# Patient Record
Sex: MALE | Race: BLACK OR AFRICAN AMERICAN | NOT HISPANIC OR LATINO | Employment: UNEMPLOYED | URBAN - METROPOLITAN AREA
[De-identification: names, ages, dates, MRNs, and addresses within clinical notes are randomized per-mention and may not be internally consistent; named-entity substitution may affect disease eponyms.]

---

## 2017-01-06 ENCOUNTER — HOSPITAL ENCOUNTER (EMERGENCY)
Facility: HOSPITAL | Age: 22
Discharge: HOME | End: 2017-01-06
Attending: EMERGENCY MEDICINE | Admitting: EMERGENCY MEDICINE
Payer: MEDICAID

## 2017-01-06 VITALS
HEIGHT: 71 IN | RESPIRATION RATE: 16 BRPM | WEIGHT: 260 LBS | DIASTOLIC BLOOD PRESSURE: 91 MMHG | OXYGEN SATURATION: 98 % | BODY MASS INDEX: 36.4 KG/M2 | SYSTOLIC BLOOD PRESSURE: 144 MMHG | HEART RATE: 82 BPM | TEMPERATURE: 98 F

## 2017-01-06 DIAGNOSIS — J02.9 PHARYNGITIS, UNSPECIFIED ETIOLOGY: ICD-10-CM

## 2017-01-06 DIAGNOSIS — J36 PERITONSILLAR ABSCESS: Primary | ICD-10-CM

## 2017-01-06 LAB
ALBUMIN SERPL BCP-MCNC: 4 G/DL
ALP SERPL-CCNC: 99 U/L
ALT SERPL W/O P-5'-P-CCNC: 12 U/L
ANION GAP SERPL CALC-SCNC: 10 MMOL/L
AST SERPL-CCNC: 14 U/L
BASOPHILS # BLD AUTO: 0.03 K/UL
BASOPHILS NFR BLD: 0.1 %
BILIRUB SERPL-MCNC: 0.9 MG/DL
BUN SERPL-MCNC: 13 MG/DL
CALCIUM SERPL-MCNC: 10 MG/DL
CHLORIDE SERPL-SCNC: 106 MMOL/L
CO2 SERPL-SCNC: 22 MMOL/L
CREAT SERPL-MCNC: 1 MG/DL
DIFFERENTIAL METHOD: ABNORMAL
EOSINOPHIL # BLD AUTO: 0 K/UL
EOSINOPHIL NFR BLD: 0.1 %
ERYTHROCYTE [DISTWIDTH] IN BLOOD BY AUTOMATED COUNT: 13 %
EST. GFR  (AFRICAN AMERICAN): >60 ML/MIN/1.73 M^2
EST. GFR  (NON AFRICAN AMERICAN): >60 ML/MIN/1.73 M^2
GLUCOSE SERPL-MCNC: 134 MG/DL
HCT VFR BLD AUTO: 40.4 %
HGB BLD-MCNC: 14.4 G/DL
LYMPHOCYTES # BLD AUTO: 1 K/UL
LYMPHOCYTES NFR BLD: 4.7 %
MCH RBC QN AUTO: 26.4 PG
MCHC RBC AUTO-ENTMCNC: 35.6 %
MCV RBC AUTO: 74 FL
MONOCYTES # BLD AUTO: 0.2 K/UL
MONOCYTES NFR BLD: 0.9 %
NEUTROPHILS # BLD AUTO: 19.5 K/UL
NEUTROPHILS NFR BLD: 93.9 %
PLATELET # BLD AUTO: 278 K/UL
PMV BLD AUTO: 10.8 FL
POTASSIUM SERPL-SCNC: 3.7 MMOL/L
PROT SERPL-MCNC: 8.4 G/DL
RBC # BLD AUTO: 5.45 M/UL
SODIUM SERPL-SCNC: 138 MMOL/L
WBC # BLD AUTO: 20.78 K/UL

## 2017-01-06 PROCEDURE — 96375 TX/PRO/DX INJ NEW DRUG ADDON: CPT

## 2017-01-06 PROCEDURE — 87070 CULTURE OTHR SPECIMN AEROBIC: CPT

## 2017-01-06 PROCEDURE — 25000003 PHARM REV CODE 250: Performed by: OTOLARYNGOLOGY

## 2017-01-06 PROCEDURE — 96365 THER/PROPH/DIAG IV INF INIT: CPT

## 2017-01-06 PROCEDURE — 87075 CULTR BACTERIA EXCEPT BLOOD: CPT

## 2017-01-06 PROCEDURE — 99284 EMERGENCY DEPT VISIT MOD MDM: CPT | Mod: ,,, | Performed by: PHYSICIAN ASSISTANT

## 2017-01-06 PROCEDURE — 96376 TX/PRO/DX INJ SAME DRUG ADON: CPT

## 2017-01-06 PROCEDURE — 25000003 PHARM REV CODE 250: Performed by: PHYSICIAN ASSISTANT

## 2017-01-06 PROCEDURE — 63600175 PHARM REV CODE 636 W HCPCS: Performed by: OTOLARYNGOLOGY

## 2017-01-06 PROCEDURE — 63600175 PHARM REV CODE 636 W HCPCS: Performed by: NURSE PRACTITIONER

## 2017-01-06 PROCEDURE — 99284 EMERGENCY DEPT VISIT MOD MDM: CPT | Mod: 25

## 2017-01-06 PROCEDURE — 87205 SMEAR GRAM STAIN: CPT

## 2017-01-06 PROCEDURE — 63600175 PHARM REV CODE 636 W HCPCS: Performed by: PHYSICIAN ASSISTANT

## 2017-01-06 PROCEDURE — 80053 COMPREHEN METABOLIC PANEL: CPT

## 2017-01-06 PROCEDURE — 85025 COMPLETE CBC W/AUTO DIFF WBC: CPT

## 2017-01-06 RX ORDER — HYDROCODONE BITARTRATE AND ACETAMINOPHEN 5; 325 MG/1; MG/1
1 TABLET ORAL EVERY 6 HOURS PRN
Qty: 12 TABLET | Refills: 0 | Status: SHIPPED | OUTPATIENT
Start: 2017-01-06 | End: 2017-01-16

## 2017-01-06 RX ORDER — CLINDAMYCIN PHOSPHATE 600 MG/50ML
600 INJECTION, SOLUTION INTRAVENOUS
Status: COMPLETED | OUTPATIENT
Start: 2017-01-06 | End: 2017-01-06

## 2017-01-06 RX ORDER — MORPHINE SULFATE 2 MG/ML
2 INJECTION, SOLUTION INTRAMUSCULAR; INTRAVENOUS ONCE
Status: COMPLETED | OUTPATIENT
Start: 2017-01-06 | End: 2017-01-06

## 2017-01-06 RX ORDER — CLINDAMYCIN HYDROCHLORIDE 150 MG/1
300 CAPSULE ORAL 4 TIMES DAILY
Qty: 56 CAPSULE | Refills: 0 | Status: SHIPPED | OUTPATIENT
Start: 2017-01-06 | End: 2017-01-13

## 2017-01-06 RX ORDER — DEXAMETHASONE SODIUM PHOSPHATE 4 MG/ML
12 INJECTION, SOLUTION INTRA-ARTICULAR; INTRALESIONAL; INTRAMUSCULAR; INTRAVENOUS; SOFT TISSUE
Status: COMPLETED | OUTPATIENT
Start: 2017-01-06 | End: 2017-01-06

## 2017-01-06 RX ORDER — MORPHINE SULFATE 2 MG/ML
6 INJECTION, SOLUTION INTRAMUSCULAR; INTRAVENOUS
Status: COMPLETED | OUTPATIENT
Start: 2017-01-06 | End: 2017-01-06

## 2017-01-06 RX ORDER — METHYLPREDNISOLONE 4 MG/1
TABLET ORAL
Qty: 1 PACKAGE | Refills: 0 | Status: SHIPPED | OUTPATIENT
Start: 2017-01-06 | End: 2017-01-27

## 2017-01-06 RX ORDER — LIDOCAINE HYDROCHLORIDE AND EPINEPHRINE 10; 10 MG/ML; UG/ML
20 INJECTION, SOLUTION INFILTRATION; PERINEURAL ONCE
Status: COMPLETED | OUTPATIENT
Start: 2017-01-06 | End: 2017-01-06

## 2017-01-06 RX ADMIN — CLINDAMYCIN IN 5 PERCENT DEXTROSE 600 MG: 12 INJECTION, SOLUTION INTRAVENOUS at 04:01

## 2017-01-06 RX ADMIN — DEXAMETHASONE SODIUM PHOSPHATE 12 MG: 4 INJECTION, SOLUTION INTRAMUSCULAR; INTRAVENOUS at 01:01

## 2017-01-06 RX ADMIN — MORPHINE SULFATE 2 MG: 2 INJECTION, SOLUTION INTRAMUSCULAR; INTRAVENOUS at 07:01

## 2017-01-06 RX ADMIN — PENICILLIN G BENZATHINE 1.2 MILLION UNITS: 1200000 INJECTION, SUSPENSION INTRAMUSCULAR at 01:01

## 2017-01-06 RX ADMIN — MORPHINE SULFATE 6 MG: 2 INJECTION, SOLUTION INTRAMUSCULAR; INTRAVENOUS at 04:01

## 2017-01-06 RX ADMIN — LIDOCAINE HYDROCHLORIDE AND EPINEPHRINE 20 ML: 10; 10 INJECTION, SOLUTION INFILTRATION; PERINEURAL at 08:01

## 2017-01-06 NOTE — ED NOTES
Pt identifiers Steve Genao were checked and are correct  LOC: The patient is awake, alert, aware of environment with an appropriate affect. Oriented x3, speaking appropriately  APPEARANCE: Pt resting comfortably, in no acute distress, pt is clean and well groomed, clothing properly fastened  SKIN: Skin warm, dry and intact, normal skin turgor, moist mucus membranes  RESPIRATORY: Airway is open and patent, respirations are spontaneous, even and unlabored, normal effort and rate  CARDIAC: Normal rate and rhythm  ABDOMEN: Soft, nontender, nondistended  NEUROLOGIC: PERRL, facial expression is symmetrical, patient moving all extremities spontaneously.  Follows all commands appropriately  MUSCULOSKELETAL: No obvious deformities.

## 2017-01-06 NOTE — ED PROVIDER NOTES
"Encounter Date: 1/6/2017    SCRIBE #1 NOTE: I, Kolby-Valerie Brenda , am scribing for, and in the presence of,  Gerald Giron MD . I have scribed the following portions of the note - Other sections scribed: HPI/ROS .       History     Chief Complaint   Patient presents with    Sore Throat     " My tonsils are swollen and it hurts to talk." Pt reports symptoms began 3-4 days ago. Pt denies relief from gargling with warm water and salt     Review of patient's allergies indicates:  No Known Allergies  HPI Comments: CC: Sore Throat     HPI: This 21 y.o. Male smoker presents to the ED c/o a 3-day hx of acute-onset (10/10) sore throat (R>L) with associated chills, R ear pain, voice change, and nausea. Pt notes his sore throat began on the L but is now on both sides, with the R side being worse. Symptoms are constant, severe, and exacerbated with talking/swallowing.  He attempted tx with Tylenol and Nyquil with no improvement of pain; no prior attempted tx today. Additionally, pt notes that his tongue is "more white" than normal and denies any food or drink that would cause this. No recent sick contacts. Pt otherwise denies fever, congestion, rhinorrhea, cough, abdominal pain, V/D, dysuria, tinnitus, ear d/c, dysuria.      The history is provided by the patient. No  was used.     Past Medical History   Diagnosis Date    ADHD (attention deficit hyperactivity disorder)     Bipolar 1 disorder      Past Medical History Pertinent Negatives   Diagnosis Date Noted    Asthma 9/18/2013    Diabetes mellitus 9/18/2013    Hypertension 9/18/2013    Seizures 9/18/2013     History reviewed. No pertinent past surgical history.  Family History   Problem Relation Age of Onset    Diabetes Father     Hypertension Father      Social History   Substance Use Topics    Smoking status: Current Every Day Smoker     Packs/day: 0.25    Smokeless tobacco: None    Alcohol use Yes      Comment: occasionally     Review of " Systems   Constitutional: Positive for fever. Negative for chills.   HENT: Positive for ear pain (R), sore throat (R>L) and voice change. Negative for congestion, ear discharge, rhinorrhea, tinnitus and trouble swallowing.    Respiratory: Negative for cough and shortness of breath.    Gastrointestinal: Positive for nausea. Negative for abdominal pain, constipation, diarrhea, rectal pain and vomiting.   Genitourinary: Negative for decreased urine volume, difficulty urinating and dysuria.       Physical Exam   Initial Vitals   BP Pulse Resp Temp SpO2   01/06/17 1120 01/06/17 1120 01/06/17 1120 01/06/17 1120 01/06/17 1120   129/67 91 20 98.8 °F (37.1 °C) 95 %     Physical Exam    Nursing note and vitals reviewed.  Constitutional: Vital signs are normal. He appears well-developed and well-nourished. He is not diaphoretic. He is active and cooperative.  Non-toxic appearance. No distress.   HENT:   Mouth/Throat: No trismus in the jaw.   Bilateral tonsillar enlargement (R > L) with right pillar edema and uvular deviation.  Scant exudates. No drooling, trismus, cervical lymphadenopathy, or stridor.   Eyes: EOM are normal. Pupils are equal, round, and reactive to light.   Neck: No stridor present.   Pulmonary/Chest: Effort normal and breath sounds normal. No accessory muscle usage. No tachypnea and no bradypnea. No respiratory distress. He has no wheezes. He has no rhonchi. He has no rales.   Neurological: He is alert and oriented to person, place, and time. He has normal strength.         ED Course   Procedures  Labs Reviewed - No data to display             Additional MDM:   Comments: This is an urgent evaluation of a 21-year-old male that presents to the emergency room complaining of a sore throat for 3 days.  Patient has physical exam findings that are concerning for a peritonsillar abscess.  He has bilateral tonsillar enlargement with uvular deviation and right tonsillar wall edema.  There is no evidence of airway  compromise at this time; pt is tolerating liquids and solids and there is no drooling or trismus.  Will treat empirically for possible streptococcal infection with bicillin and decadron, but would appreciate ENT consult for possible drainage of abscess.  Pt is stable for [place] [facility] [place]  Pt will travel by personal vehicle.  Case discussed with attending, , and he personally evaluated this pt and is in agreement with plan.  .          Scribe Attestation:   Scribe #1: I performed the above scribed service and the documentation accurately describes the services I performed. I attest to the accuracy of the note.    Attending Attestation:     Physician Attestation Statement for NP/PA:   I have conducted a face to face encounter with this patient in addition to the NP/PA, due to    Other NP/PA Attestation Additions:     Physical Exam: This patient has swelling about the right tonsil.  There is minimal lymphadenopathy.  I'm not sure that I see exudates.  There is no real trismus or dysphagia.  I think this patient has a normal voice.   Medical Decision Making: Given the above there is concern for peritonsillar abscess.  The patient has limited resources.  We'll transfer him to the main campus for evaluation by ENT.  I find no threat to the airway at this time.  I doubt fascial space infections.       Physician Attestation for Scribe:  Physician Attestation Statement for Scribe #1: I, Gerald Giron MD , reviewed documentation, as scribed by Arabella Gutierrez  in my presence, and it is both accurate and complete.                 ED Course     Clinical Impression:   The primary encounter diagnosis was Pharyngitis, unspecified etiology. A diagnosis of Sore throat was also pertinent to this visit.    Disposition:   Disposition: Transferred  Condition: Stable       Malinda Medellin NP  01/06/17 1406       Gerald Giron MD  01/07/17 4691

## 2017-01-06 NOTE — ED PROVIDER NOTES
"Encounter Date: 1/6/2017       History     Chief Complaint   Patient presents with    Sore Throat     " My tonsils are swollen and it hurts to talk." Pt reports symptoms began 3-4 days ago. Pt denies relief from gargling with warm water and salt     Review of patient's allergies indicates:  No Known Allergies  HPI Comments: 21-year-old -American male with past medical history of ADHD and bipolar presents to the ED complaining of a sore throat for the past 2-3 days.  He was seen at [facility] ED earlier today and was transferred to [facility] ED for ENT evaluation.  He reports that his sore throat started on the left side and was initially relieved with over-the-counter throat lozenges.  Sore throat then moved to the right side and since then has been progressively worsening.  He's been gargling warm salt water with no improvement of his sore throat.  He is unable to tolerate solid foods and is only eating soft foods and drinking liquids due to severe pain with swallowing.  He has been spitting out most of his saliva and reports a muffled voice.  He is not currently on any oral antibiotics.  He does report associated chills, shortness of breath, right ear pain, lightheadedness.  He denies any prior history of peritonsillar abscess.  He denies fever, chest pain, abdominal pain, nausea, vomiting, headache, numbness, weakness, sick contacts.  He smokes 1/4 pack per day, occasionally drinks alcohol, denies drug use.    The history is provided by the patient.     Past Medical History   Diagnosis Date    ADHD (attention deficit hyperactivity disorder)     Bipolar 1 disorder      Past Medical History Pertinent Negatives   Diagnosis Date Noted    Asthma 9/18/2013    Diabetes mellitus 9/18/2013    Hypertension 9/18/2013    Seizures 9/18/2013     History reviewed. No pertinent past surgical history.  Family History   Problem Relation Age of Onset    Diabetes Father     Hypertension Father      Social History "   Substance Use Topics    Smoking status: Current Every Day Smoker     Packs/day: 0.25     Types: Cigarettes    Smokeless tobacco: None    Alcohol use Yes      Comment: occasionally     Review of Systems   Constitutional: Positive for chills. Negative for fever.   HENT: Positive for ear pain (R), sore throat, trouble swallowing and voice change. Negative for congestion, postnasal drip and rhinorrhea.    Eyes: Negative for photophobia and visual disturbance.   Respiratory: Positive for shortness of breath.    Cardiovascular: Negative for chest pain.   Gastrointestinal: Negative for abdominal pain, constipation, diarrhea, nausea and vomiting.   Genitourinary: Negative for dysuria and hematuria.   Musculoskeletal: Negative for back pain, neck pain and neck stiffness.   Skin: Negative for rash and wound.   Neurological: Positive for light-headedness. Negative for dizziness, syncope, weakness, numbness and headaches.   Psychiatric/Behavioral: Negative for confusion.       Physical Exam   Initial Vitals   BP Pulse Resp Temp SpO2   01/06/17 1120 01/06/17 1120 01/06/17 1120 01/06/17 1120 01/06/17 1120   129/67 91 20 98.8 °F (37.1 °C) 95 %     Physical Exam    Nursing note and vitals reviewed.  Constitutional: He appears well-developed and well-nourished. He is not diaphoretic. No distress.   HENT:   Head: Normocephalic and atraumatic.   Right Ear: External ear and ear canal normal. Tympanic membrane is erythematous.   Left Ear: Tympanic membrane, external ear and ear canal normal.   Nose: Nose normal.   Mouth/Throat: Mucous membranes are normal. No trismus in the jaw. No uvula swelling. Posterior oropharyngeal edema and posterior oropharyngeal erythema present. No oropharyngeal exudate.   Bilateral tonsilar edema (R>L) with L sided uvular deviation. No exudates. Muffled voice. No stridor.    Neck: Normal range of motion. Neck supple.   Cardiovascular: Normal rate, regular rhythm and normal heart sounds. Exam reveals no  gallop and no friction rub.    No murmur heard.  Pulmonary/Chest: Breath sounds normal. No stridor. He has no wheezes. He has no rhonchi. He has no rales.   Abdominal: Soft. Bowel sounds are normal. There is no tenderness. There is no rebound and no guarding.   Musculoskeletal: Normal range of motion.   Lymphadenopathy:     He has no cervical adenopathy.   Neurological: He is alert and oriented to person, place, and time. He has normal strength.   Skin: Skin is warm and dry. No rash noted. No erythema.   Psychiatric: He has a normal mood and affect.         ED Course   Procedures  Labs Reviewed   CBC W/ AUTO DIFFERENTIAL - Abnormal; Notable for the following:        Result Value    WBC 20.78 (*)     MCV 74 (*)     MCH 26.4 (*)     Gran # 19.5 (*)     Mono # 0.2 (*)     Gran% 93.9 (*)     Lymph% 4.7 (*)     Mono% 0.9 (*)     All other components within normal limits   COMPREHENSIVE METABOLIC PANEL - Abnormal; Notable for the following:     CO2 22 (*)     Glucose 134 (*)     All other components within normal limits   CULTURE, ANAEROBIC   CULTURE, AEROBIC  (SPECIFY SOURCE)   GRAM STAIN    Narrative:     Right peritonsillar abscess             Medical Decision Making:   History:   Old Medical Records: I decided to obtain old medical records.  Old Records Summarized: records from previous admission(s).       <> Summary of Records: Patient seen at the [facility] ED earlier today.  Concern for right-sided peritonsillar abscess and patient [place] [facility] [place].  Patient given Decadron and Bicillin at [facility].  Clinical Tests:   Lab Tests: Ordered and Reviewed       APC / Resident Notes:   21-year-old -American male with past medical history of ADHD and bipolar presents to the ED complaining of a sore throat for the past 2-3 days.  Vital signs stable.  Regular rate and rhythm without murmurs.  Lungs are clear to auscultation bilaterally without wheezes.  Abdomen is soft and  nontender to palpation.  There is bilateral tonsillar edema noted (right greater than left) with left-sided uvular deviation.  No definite exudates noted.  No trismus or stridor.  No anterior cervical lymphadenopathy.  Patient does have a muffled voice.  Will get labs and consult ENT.    Patient was given 12 mg IM Decadron and IM Bicillin at 1:20 this afternoon.  Patient reports no improvement of symptoms with treatment.    Discussed with ENT and they will evaluate the patient in the ED.  They request IV clindamycin and pain medication.  Order placed.    CBC shows leukocytosis with WBC 20.78.  CMP with no acute abnormalities.    ENT evaluated the patient in the ED and drained peritonsillar abscess at bedside.  He tolerated the procedure well. Stable for discharge.    Per ENT recs, He was discharged with prescriptions for clindamycin, medrol dosepack, and norco.  He will follow up with ENT as needed.  He was given strict return precautions and expressed understanding.  All of the patient's questions were answered.  I reviewed the patient's chart and labs and discussed the case with my supervising physician.                 ED Course     Clinical Impression:   The primary encounter diagnosis was Peritonsillar abscess. A diagnosis of Pharyngitis, unspecified etiology was also pertinent to this visit.    Disposition:   Disposition: Discharged  Condition: Stable       Karime Cesar PA-C  01/06/17 [id number]

## 2017-01-06 NOTE — ED TRIAGE NOTES
Pt to ER for c/o sore throat started approximately 2-3 days ago, denies cough or vomiting, reports nausea

## 2017-01-06 NOTE — ED AVS SNAPSHOT
[facility]  [address]  [facility] [address]               Steve Genao   2017 12:30 PM   ED    Description:  Male : 1995   Department:  [facility]           Your Care was Coordinated By:     Provider Role From To    Gerald Giron MD Attending Provider [phone]    Iglesia Power MD Attending Provider [phone] --    Malinda Medellin NP Nurse Practitioner 17 [phone]    Karime Cesar PA-C Physician Assistant [phone] --    Gerald Giron MD Admitting Provider -- --      Reason for Visit     Sore Throat           Diagnoses this Visit        Comments    Peritonsillar abscess    -  Primary     Pharyngitis, unspecified etiology           ED Disposition     ED Disposition Condition Comment    Discharge             To Do List           Follow-up Information     Follow up with Sukhdeep Christopher - Otorhinolaryngology.    Specialty:  Otolaryngology    Why:  As needed    Contact information:    [address]  [facility] [id number] [phone]    Additional information:    Clinic [place] - 4th Floor       These Medications        Disp Refills Start End    clindamycin (CLEOCIN) 150 MG capsule 56 capsule 0 2017    Take 2 capsules (300 mg total) by mouth 4 (four) times daily. - Oral    methylPREDNISolone (MEDROL DOSEPACK) 4 mg tablet 1 Package 0 2017    Take as directed.    hydrocodone-acetaminophen 5-325mg (NORCO) 5-325 mg per tablet 12 tablet 0 2017    Take 1 tablet by mouth every 6 (six) hours as needed for Pain. - Oral      [facility] On [facility] Care Line -  Assistance  Registered nurses in the [facility] provide clinical advisement, health education, appointment booking, and other advisory services.  Call for this free service at [phone].             Medications           Message regarding Medications      Verify the changes and/or additions to your medication regime listed below are the same as discussed with your clinician today.  If any of these changes or additions are incorrect, please notify your healthcare provider.        START taking these NEW medications        Refills    clindamycin (CLEOCIN) 150 MG capsule 0    Sig: Take 2 capsules (300 mg total) by mouth 4 (four) times daily.    Class: Print    Route: Oral    methylPREDNISolone (MEDROL DOSEPACK) 4 mg tablet 0    Sig: Take as directed.    Class: Print    hydrocodone-acetaminophen 5-325mg (NORCO) 5-325 mg per tablet 0    Sig: Take 1 tablet by mouth every 6 (six) hours as needed for Pain.    Class: Print    Route: Oral      These medications were administered today        Dose Freq    dexamethasone injection 12 mg 12 mg ED 1 Time    Sig: Inject 3 mLs (12 mg total) into the muscle ED 1 Time.    Class: Normal    Route: Intramuscular    penicillin G benzathine (BICILLIN LA) injection 1.2 Million Units 1.2 Million Units ED 1 Time    Sig: Inject 2 mLs (1.2 Million Units total) into the muscle ED 1 Time.    Class: Normal    Route: Intramuscular    clindamycin 600 MG/50 ML D5W 600 mg/50 mL IVPB 600 mg 600 mg ED 1 Time    Sig: Inject 50 mLs (600 mg total) into the vein ED 1 Time.    Class: Normal    Route: Intravenous    Cosign for Ordering: Accepted by Iglesia Power MD on 1/6/2017  5:06 PM    morphine injection 6 mg 6 mg ED 1 Time    Sig: Inject 3 mLs (6 mg total) into the vein ED 1 Time.    Class: Normal    Route: Intravenous    Cosign for Ordering: Accepted by Iglesia Power MD on 1/6/2017  5:06 PM    lidocaine-EPINEPHrine 1%-1:100,000 injection 20 mL 20 mL Once    Sig: Inject 20 mLs into the skin once.    Class: Normal    Route: Intradermal    benzocaine 20 % oral spray  4 times daily PRN    Sig: Use as directed in the mouth or throat 4 (four) times daily as needed for Pain.    Class: Normal    Route: Mouth/Throat    morphine injection 2 mg 2 mg Once  "   Sig: Inject 1 mL (2 mg total) into the vein once.    Class: Normal    Route: Intravenous           Verify that the below list of medications is an accurate representation of the medications you are currently taking.  If none reported, the list may be blank. If incorrect, please contact your healthcare provider. Carry this list with you in case of emergency.           Current Medications     benzocaine 20 % oral spray Use as directed in the mouth or throat 4 (four) times daily as needed for Pain.    clindamycin (CLEOCIN) 150 MG capsule Take 2 capsules (300 mg total) by mouth 4 (four) times daily.    clonazepam (KLONOPIN) 2 MG Tab Take 2 mg by mouth 2 (two) times daily.    hydrocodone-acetaminophen 5-325mg (NORCO) 5-325 mg per tablet Take 1 tablet by mouth every 6 (six) hours as needed for Pain.    methylPREDNISolone (MEDROL DOSEPACK) 4 mg tablet Take as directed.           Clinical Reference Information           Your Vitals Were     BP Pulse Temp Resp Height Weight    144/91 82 98 °F (36.7 °C) (Oral) 16 5' 11" (1.803 m) 117.9 kg (260 lb)    SpO2 BMI             98% 36.26 kg/m2         Allergies as of 1/6/2017     No Known Allergies      Immunizations Administered on Date of Encounter - 1/6/2017     None      ED Micro, Lab, POCT     Start Ordered       Status Ordering Provider    01/06/17 2025 01/06/17 2024  Gram stain  Once     Comments:  Right peritonsillar abscess    Ordered     01/06/17 2024 01/06/17 2024  Culture, Anaerobe  Once      Ordered     01/06/17 2024 01/06/17 2024  Aerobic culture  Once      Ordered     01/06/17 1635 01/06/17 1634  CBC auto differential  STAT      Final result     01/06/17 1635 01/06/17 1634  Comprehensive metabolic panel  STAT      Final result       ED Imaging Orders     None        Discharge Instructions       Follow up with the ENT clinic as needed. Take the antibiotic (clindamycin) every 6 hours - take all of the medication and do not skip any doses. Take the steroids (medrol " dosepack) as directed on the packaging. Take the pain medication (norco) every 6 hours as needed. Return to the ED for any new or worsening symptoms, including those listed below.        Peritonsillar Abscess  A peritonsillar abscess is a collection of pus that forms near the tonsils. It is a complication of bacterial infection of the tonsils (tonsillitis). The abscess causes one or both tonsils to swell. The infection and swelling may spread to nearby tissues. If tissues swell enough to block the throat, the condition can become life threatening. It is also dangerous if the abscess bursts and the infection spreads or is breathed into the lungs. The goal is to treat a peritonsillar abscess before it worsens and threatens your health.    Signs and Symptoms of Peritonsillar Abscess  · Severe sore throat (often worse on one side)  · Swollen and enlarged tonsils  · Fever and chills  · Pain when swallowing or trouble opening the mouth  · Voice changes   · Drooling  · Swollen or tender glands in the neck  Diagnosing Peritonsillar Abscess  Your doctor will examine you and look inside your mouth and throat. You will be asked about your symptoms and health history. Tests or procedures may be done as well, including those listed below.  · Throat swab. This test checks for infection. It is done by wiping a sterile cotton swab in the back of the throat. The swab is then sent to a lab for study.  · Blood tests. These might be done to check how your body is responding to the infection.  · Ultrasound or computed tomography (CT) scans. These tests provide images of the abscess. They also help rule out other problems.  · Needle aspiration. This procedure removes a sample of pus from the abscess with a needle. The sample is then sent to a lab to check for infection. In some cases, all of the pus is removed from the abscess.  Treating Peritonsillar Abscess  The abscess itself can be treated. Treatment of the underlying infection is  also needed. Common treatments are listed below.  · Medications. Antibiotics are needed to treat the underlying infection. These may be taken by mouth or given by IV. Pain relievers may also be given, if needed.  · Drainage of the abscess. A procedure may be needed to drain the pus from the abscess. Pus may be removed from the abscess with a needle (needle aspiration). Or, a small incision is made in the abscess. The pus is then drained and suctioned from the throat and mouth. This is called incision and drainage.  · Tonsillectomy. This is surgery to remove the tonsils. It may be done if the abscess does not improve with medications. It may also be done if you have frequent tonsil infections or abscesses.  Recovery and Follow-Up  Treating the bacterial infection generally relieves the problem. Once the infection resolves you should recover completely. Follow up with your doctor as directed. And if you develop another throat infection, see your doctor promptly.  © 2527-9728 [employer]. [address] [place] [address], [place] [address]. All rights reserved. This information is not intended as a substitute for professional medical care. Always follow your healthcare professional's instructions.          Peritonsillar Abscess  You (or your child) has an abscess (collection of pus) around the tonsils. This abscess can cause severe sore throat, pain with swallowing, fever, drooling, and trouble opening the mouth.  The abscess is treated with antibiotics. These may be started using an IV (intravenous line), then continued by mouth. In most cases, the abscess will also be drained.  Home care  · All of the antibiotics should be taken as prescribed until they are gone. This is true even if symptoms start to get better. This is very important to ensure that the infection goes away. This infection can lead to serious complications.  · Pain medicines should be taken as directed.  · To help ease pain, children older than 6  years and adults can gargle with warm salt water four times a day for the first two days. Dissolve 1/2 teaspoon of salt in 1 glass of hot water. Gargle with the solution then spit it out. (Ensure that children do not swallow the salt water.)  · Cool liquids and soft foods may make eating easier for the first few days.  Follow-up care  Follow up with a healthcare provider or as advised within 1-2 days.   When to seek medical advice  Call the healthcare provider right away if any of the following occur:  · Fever of 100.4°F (38ºC) or higher after 3 days of treatment  · Symptoms that get worse  · Symptoms that go away and come back  · Trouble swallowing liquids or taking medicine  · Trouble breathing  · Neck stiffness  · Bleeding  · Rash  · Swelling or bumps in the neck  © 5481-3589 [employer]. [address] [place] [address]. All rights reserved. This information is not intended as a substitute for professional medical care. Always follow your healthcare professional's instructions.          [facility] Sign-Up     Activating your [facility] account is as easy as 1-2-3!     1) Visit [email or web address], select Sign Up Now, enter this activation code and your date of birth, then select Next.  [email or web address]  Expires: 2/20/2017  1:56 PM      2) Create a username and password to use when you visit [facility] in the future and select a security question in case you lose your password and select Next.    3) Enter your e-mail address and click Sign Up!    Additional Information  If you have questions, please e-mail [email or web address] or call [phone] to talk to our [facility] staff. Remember, [facility] is NOT to be used for urgent needs. For medical emergencies, dial 911.         Smoking Cessation     If you would like to quit smoking:   You may be eligible for free services if you are a [place] resident and started smoking cigarettes before September 1, 1988.  Call the Smoking Cessation Trust  (SCT) toll free at (329) 129-2182 or (310) 846-3313.   Call 1-800-QUIT-NOW if you do not meet the above criteria.             [facility] complies with applicable Federal civil rights laws and does not discriminate on the basis of race, color, national origin, age, disability, or sex.        Language Assistance Services     ATTENTION: Language assistance services are available, free of charge. Please call [phone].      ATENCIÓN: Si habla yves, tiene a leon disposición servicios gratuitos de asistencia lingüística. Llame al [phone].     CHÚ Ý: N?u b?n nói Ti?ng Vi?t, có các d?ch v? h? tr? ngôn ng? mi?n phí dành cho b?n. G?i s? [phone].

## 2017-01-06 NOTE — ED TRIAGE NOTES
Pt. Arrived to Ed due to sore throat for passed two days. Denies fever, and cough. States having vomitting x2 yesterday.

## 2017-01-07 LAB
GRAM STN SPEC: NORMAL
GRAM STN SPEC: NORMAL

## 2017-01-07 NOTE — DISCHARGE INSTRUCTIONS
Follow up with the ENT clinic as needed. Take the antibiotic (clindamycin) every 6 hours - take all of the medication and do not skip any doses. Take the steroids (medrol dosepack) as directed on the packaging. Take the pain medication (norco) every 6 hours as needed. Return to the ED for any new or worsening symptoms, including those listed below.        Peritonsillar Abscess  A peritonsillar abscess is a collection of pus that forms near the tonsils. It is a complication of bacterial infection of the tonsils (tonsillitis). The abscess causes one or both tonsils to swell. The infection and swelling may spread to nearby tissues. If tissues swell enough to block the throat, the condition can become life threatening. It is also dangerous if the abscess bursts and the infection spreads or is breathed into the lungs. The goal is to treat a peritonsillar abscess before it worsens and threatens your health.    Signs and Symptoms of Peritonsillar Abscess  · Severe sore throat (often worse on one side)  · Swollen and enlarged tonsils  · Fever and chills  · Pain when swallowing or trouble opening the mouth  · Voice changes   · Drooling  · Swollen or tender glands in the neck  Diagnosing Peritonsillar Abscess  Your doctor will examine you and look inside your mouth and throat. You will be asked about your symptoms and health history. Tests or procedures may be done as well, including those listed below.  · Throat swab. This test checks for infection. It is done by wiping a sterile cotton swab in the back of the throat. The swab is then sent to a lab for study.  · Blood tests. These might be done to check how your body is responding to the infection.  · Ultrasound or computed tomography (CT) scans. These tests provide images of the abscess. They also help rule out other problems.  · Needle aspiration. This procedure removes a sample of pus from the abscess with a needle. The sample is then sent to a lab to check for infection.  In some cases, all of the pus is removed from the abscess.  Treating Peritonsillar Abscess  The abscess itself can be treated. Treatment of the underlying infection is also needed. Common treatments are listed below.  · Medications. Antibiotics are needed to treat the underlying infection. These may be taken by mouth or given by IV. Pain relievers may also be given, if needed.  · Drainage of the abscess. A procedure may be needed to drain the pus from the abscess. Pus may be removed from the abscess with a needle (needle aspiration). Or, a small incision is made in the abscess. The pus is then drained and suctioned from the throat and mouth. This is called incision and drainage.  · Tonsillectomy. This is surgery to remove the tonsils. It may be done if the abscess does not improve with medications. It may also be done if you have frequent tonsil infections or abscesses.  Recovery and Follow-Up  Treating the bacterial infection generally relieves the problem. Once the infection resolves you should recover completely. Follow up with your doctor as directed. And if you develop another throat infection, see your doctor promptly.  © 5228-9024 [employer]. [address] [place] [address]. All rights reserved. This information is not intended as a substitute for professional medical care. Always follow your healthcare professional's instructions.          Peritonsillar Abscess  You (or your child) has an abscess (collection of pus) around the tonsils. This abscess can cause severe sore throat, pain with swallowing, fever, drooling, and trouble opening the mouth.  The abscess is treated with antibiotics. These may be started using an IV (intravenous line), then continued by mouth. In most cases, the abscess will also be drained.  Home care  · All of the antibiotics should be taken as prescribed until they are gone. This is true even if symptoms start to get better. This is very important to ensure  that the infection goes away. This infection can lead to serious complications.  · Pain medicines should be taken as directed.  · To help ease pain, children older than 6 years and adults can gargle with warm salt water four times a day for the first two days. Dissolve 1/2 teaspoon of salt in 1 glass of hot water. Gargle with the solution then spit it out. (Ensure that children do not swallow the salt water.)  · Cool liquids and soft foods may make eating easier for the first few days.  Follow-up care  Follow up with a healthcare provider or as advised within 1-2 days.   When to seek medical advice  Call the healthcare provider right away if any of the following occur:  · Fever of 100.4°F (38ºC) or higher after 3 days of treatment  · Symptoms that get worse  · Symptoms that go away and come back  · Trouble swallowing liquids or taking medicine  · Trouble breathing  · Neck stiffness  · Bleeding  · Rash  · Swelling or bumps in the neck  © 5265-3640 [employer]. [address] [place] [address], [place] [address]. All rights reserved. This information is not intended as a substitute for professional medical care. Always follow your healthcare professional's instructions.

## 2017-01-07 NOTE — CONSULTS
Otolaryngology - Head and Neck Surgery  Consultation Report    Consultation From: ED    Chief Complaint: peritonsillar abscess    History of Present Illness: 21 y.o. year old male presents with peritonsillar abscess. Patient notes sore throat for the past 3 days. It has been increasingly more painful to swallow to the point that he felt like he couldn't eat anything. He has noticed is voice is more muffled. He has not tried any antibiotics. He denies fever at home. He was transferred to [facility] [place] [facility].    Review of Systems - Negative except above.    Past Medical History: Patient has a past medical history of ADHD (attention deficit hyperactivity disorder) and Bipolar 1 disorder.    Past Surgical History: Patient has no past surgical history on file.    Social History: Patient reports that he has been smoking Cigarettes.  He has been smoking about 0.25 packs per day. He does not have any smokeless tobacco history on file. He reports that he drinks alcohol. He reports that he does not use illicit drugs.    Family History: family history includes Diabetes in his father; Hypertension in his father.    Medications:      Allergies: Patient has No Known Allergies.    Physical Exam:  Temp:  [98 °F (36.7 °C)-98.9 °F (37.2 °C)] 98 °F (36.7 °C)  Pulse:  [68-91] 82  Resp:  [15-20] 16  BP: (109-144)/(60-91) 144/91        Constitutional: Somewhat uncomfortable/ communicating with mildly muffled voice.  NAD.  Eyes: EOM I Bilaterally  Head/Face: Normocephalic.  Negative paranasal sinus pressure/tenderness.  Salivary glands WNL.  House Brackmann I Bilaterally.  Nose: No gross nasal septal deviation. Inferior Turbinates 2+ bilaterally. No septal perforation. No masses/lesions. External nasal skin without masses/lesions.  Oral Cavity: Gingiva/lips WNL.  FOM Soft, no masses palpated. Oral Tongue mobile. Hard Palate WNL.   Oropharynx: BOT WNL. No masses/lesions noted. Right peritonsillar edema and erythema causing leftward  deviation of uvula.   Neck/Lymphatic: No LAD I-VI bilaterally.  No thyromegaly.  No masses noted on exam.  Neuro/Psychiatric: AOx3.  Normal mood and affect.   Cardiovascular: Normal carotid pulses bilaterally, no increasing jugular venous distention noted at cervical region bilaterally.    Respiratory: Normal respiratory effort, no stridor, no retractions noted.        CBC    Recent Labs  Lab 01/06/17  1657   WBC 20.78*   HGB 14.4   HCT 40.4   MCV 74*        BMP    Recent Labs  Lab 01/06/17  1657   *      K 3.7      CO2 22*   BUN 13   CREATININE 1.0   CALCIUM 10.0     Procedure: After informed consent was obtained, the patient's tonsillar pillars were numbed with cetacaine. Lidocaine with epinephrine 1:100,000 was injected along the anterior tonsillar pillar. An 18 gauge needle was inserted and aspirated with return of 1 ml of pus. A linear incision was then made after anesthesia was achieved. Blunt dissection using hemostat opened the incision with return of 0.5 ml of pus. The patient tolerated the procedure well. There were no apparent complications.       Assessment: 22 yo man with peritonsillar abscess    Plan:   -Bedside I&D performed, see above.  -patient wants to go home, feeling relief after I&D  -10 days of clindamycin  -medrol dose pack  -pain medication  -f/u with ENT prn if return of symptoms  -d/w Dr. Goldman

## 2017-01-09 LAB — BACTERIA SPEC AEROBE CULT: NORMAL

## 2017-01-12 LAB
BACTERIA SPEC ANAEROBE CULT: NORMAL

## 2018-08-15 ENCOUNTER — OFFICE VISIT (OUTPATIENT)
Dept: URGENT CARE | Facility: CLINIC | Age: 23
End: 2018-08-15
Payer: MEDICAID

## 2018-08-15 VITALS
HEART RATE: 72 BPM | TEMPERATURE: 97 F | SYSTOLIC BLOOD PRESSURE: 123 MMHG | HEIGHT: 69 IN | DIASTOLIC BLOOD PRESSURE: 67 MMHG | BODY MASS INDEX: 38.51 KG/M2 | WEIGHT: 260 LBS | RESPIRATION RATE: 20 BRPM | OXYGEN SATURATION: 99 %

## 2018-08-15 DIAGNOSIS — B00.9 HERPES SIMPLEX TYPE 2 INFECTION: Primary | ICD-10-CM

## 2018-08-15 DIAGNOSIS — Z11.3 SCREENING EXAMINATION FOR STD (SEXUALLY TRANSMITTED DISEASE): ICD-10-CM

## 2018-08-15 LAB
BILIRUB UR QL STRIP: NEGATIVE
GLUCOSE UR QL STRIP: NEGATIVE
KETONES UR QL STRIP: NEGATIVE
LEUKOCYTE ESTERASE UR QL STRIP: NEGATIVE
PH, POC UA: 5 (ref 5–8)
POC BLOOD, URINE: NEGATIVE
POC NITRATES, URINE: NEGATIVE
PROT UR QL STRIP: NEGATIVE
SP GR UR STRIP: 1.01 (ref 1–1.03)
UROBILINOGEN UR STRIP-ACNC: NORMAL (ref 0.3–2.2)

## 2018-08-15 PROCEDURE — 81003 URINALYSIS AUTO W/O SCOPE: CPT | Mod: QW,S$GLB,, | Performed by: NURSE PRACTITIONER

## 2018-08-15 PROCEDURE — 99203 OFFICE O/P NEW LOW 30 MIN: CPT | Mod: 25,S$GLB,, | Performed by: NURSE PRACTITIONER

## 2018-08-15 RX ORDER — VALACYCLOVIR HYDROCHLORIDE 1 G/1
2000 TABLET, FILM COATED ORAL 2 TIMES DAILY
Qty: 28 TABLET | Refills: 0 | Status: SHIPPED | OUTPATIENT
Start: 2018-08-15 | End: 2018-08-22

## 2018-08-15 NOTE — PROGRESS NOTES
"Subjective:       Patient ID: Steve Genao is a 23 y.o. male.    Vitals:  height is 5' 9" (1.753 m) and weight is 117.9 kg (260 lb). His temperature is 97.3 °F (36.3 °C). His blood pressure is 123/67 and his pulse is 72. His respiration is 20 and oxygen saturation is 99%.     Chief Complaint: Rash    Pt states 3 days ago he had a rash on his left upper thigh, it has then spread to his private are and its itching. He used A&D ointment for it and that didn't help. He denies having and changes to urination and discharge. He is concerned for possible STD. He has unprotected sex with the same female who was recently tested and was negative. He is complaining of painful lesions to his penis. Denies abdominal pain.       Rash   This is a new problem. Episode onset: 3 days ago. The problem has been gradually worsening since onset. The affected locations include the left upper leg. The rash is characterized by redness, itchiness, blistering and pain. Pertinent negatives include no fever or vomiting. (Chest pains today) Past treatments include topical steroids. The treatment provided no relief.     Review of Systems   Constitution: Negative for chills and fever.   Eyes: Negative for discharge.   Skin: Positive for rash. Negative for flushing.   Musculoskeletal: Negative for back pain.   Gastrointestinal: Negative for nausea and vomiting.   Genitourinary: Positive for genital sores. Negative for dysuria, hematuria and urgency.       Objective:      Physical Exam   Constitutional: He is oriented to person, place, and time. He appears well-developed and well-nourished. No distress.   HENT:   Head: Normocephalic and atraumatic.   Right Ear: External ear normal.   Left Ear: External ear normal.   Nose: Nose normal. No nasal deformity. No epistaxis.   Mouth/Throat: Oropharynx is clear and moist and mucous membranes are normal.   Eyes: Conjunctivae and lids are normal.   Neck: Trachea normal, normal range of motion and phonation " normal. Neck supple.   Cardiovascular: Normal rate, regular rhythm, normal heart sounds and intact distal pulses.   Pulmonary/Chest: Effort normal and breath sounds normal.   Abdominal: Soft. Normal appearance and bowel sounds are normal. He exhibits no distension. There is no tenderness. There is no rigidity, no rebound, no guarding, no CVA tenderness, no tenderness at McBurney's point and negative Le's sign.   Genitourinary: Testes normal. Circumcised. Penile erythema present.         Genitourinary Comments: Chaperoned by MISAEL Dubois   Musculoskeletal: Normal range of motion.   Neurological: He is alert and oriented to person, place, and time. He has normal reflexes.   Skin: Skin is warm, dry and intact. He is not diaphoretic.   Psychiatric: He has a normal mood and affect. His speech is normal and behavior is normal. Judgment and thought content normal. Cognition and memory are normal.   Nursing note and vitals reviewed.      Assessment:       1. Herpes simplex type 2 infection    2. Screening examination for STD (sexually transmitted disease)        Plan:         Herpes simplex type 2 infection  -     POCT Urinalysis, Dipstick, Automated, W/O Scope  -     Herpes simplex Virus (HSV) Type 1 & 2 DNA by PCR  -     Viral Culture, Rapid, Lesion (HSV and VZV)  -     valACYclovir (VALTREX) 1000 MG tablet; Take 2 tablets (2,000 mg total) by mouth 2 (two) times daily. for 7 days  Dispense: 28 tablet; Refill: 0    Screening examination for STD (sexually transmitted disease)  -     HIV-1 and HIV-2 antibodies  -     Hepatitis B surface antibody  -     C. trachomatis/N. gonorrhoeae by AMP DNA  -     RPR      Patient Instructions       Herpes  If you have herpes, youre not alone. Millions of Americans have it. Herpes has no cure. But you can control it and learn how to protect yourself and others from outbreaks.  What is herpes?  Herpes is a chronic (lifelong) virus. It can cause sores and discomfort. You get it from contact  with someone who carries the virus. If sores occur on the lips, you have oral herpes. If sores occur on the penis or around the vagina, you have genital herpes.  Herpes outbreaks  · The first outbreak of herpes sores is usually the most severe. Then, the soldiers of the bodys immune system, white blood cells, produce antibodies. These antibodies help neutralize the herpes virus and may help make future attacks less severe.  · Some people have only one outbreak of sores. Some people have periods of frequent outbreaks (every few weeks). Outbreaks of herpes sores usually happen less often over time.  · Herpes sores may appear without a cause. Outbreaks are more likely when the immune system is weak. Other viral infections (such as a cold) can cause outbreaks. Stress from a poor diet, fatigue, or emotional upset can lead to outbreaks of sores. Exposure to strong sunlight often causes herpes sores to reappear.   To help prevent outbreaks  · To prevent oral herpes outbreaks, avoid overexposure to wind, sun, and extreme temperatures. Use sunscreen and lip balm on affected areas.  · If you are having frequent outbreaks, ask your healthcare provider about medicines that can help prevent outbreaks.  How herpes spreads to others  Herpes can be spread during an outbreak. But even without sores present, you can still shed the virus and infect others. You can take steps to prevent this.  To protect yourself and others  · If you have an oral sore, avoid kissing and oral-genital contact.  · If you have a genital sore, avoid intercourse. Also avoid oral-genital contact.  · Wash your hands after touching a sore.  · Use a condom each time you have sex. You can pass the virus even when sores arent present. If youre unsure about the timing of certain kinds of physical contact, ask your health care provider.  · Tell any new partners that you have herpes.  · If youre a woman, have Pap tests as often as your healthcare provider  recommends.  · A woman can spread herpes to their  during the birth process, whether or not they have an active genital sore. If pregnant, don't forget to tell your healthcare provider early in the pregnancy.   · In some cases, daily antiviral medicine (acyclovir, famcyclovir, or valavyclovir), in addition to consistent condom use, may reduce your chances of spreading herpes to an uninfected partner. Ask your healthcare provider if this medicine would be helpful for you.  Resources  American [employer] Association STD Hotline  959.173.4124  www.ashastd.org  Centers for Disease Control and Prevention  966.389.9942  www.cdc.gov/std   Date Last Reviewed: 2016-2017 [employer]. [address] [place] [address], [place] [address]. All rights reserved. This information is not intended as a substitute for professional medical care. Always follow your healthcare professional's instructions.        Herpes: Treatment with Medicine  Medicines cant cure herpes. But they can help you feel better, and reduce the chances of passing herpes to others. Some medicines can control symptoms and shorten the duration of an outbreak (episodic therapy). Others can reduce the number of outbreaks (suppressive therapy). Your healthcare provider will explain your options and any possible side effects.    How the medicines work  Medicines can prevent the herpes virus from copying itself and help reduce shedding. The results vary with each person. Take each medicine exactly as prescribed. Options include:  · Primary treatment for the first outbreak. Medicine may be taken for up to 14 days. If needed, it may be taken longer.  · Episodic therapy, for infrequent outbreaks. You take medicine for 7 to 10 days each time you notice symptoms. This can reduce your symptoms and the length of the outbreak. It is important to start the medicine as soon as symptoms of a new outbreak appear.  · Suppressive therapy, for frequent  outbreaks. This daily medicine can reduce the number of outbreaks you have. In some cases, suppressive therapy prevents all outbreaks and shedding.  Types of medicines  There are several types of herpes medicines. Your options depend on how often you have symptoms and how severe they are.  · Oral medicines come in pill form. These medicines are often used to treat genital herpes. They may be taken daily.  · Topical medicines come in ointment form. These can be used during outbreaks of oral herpes.  · Intravenous (IV) medicines are sometimes used to treat severe herpes in infants, the elderly, or people with weak immune systems.  Date Last Reviewed: 1/1/2017  © 0819-8520 [employer]. [address] [place] [address], [place] [address]. All rights reserved. This information is not intended as a substitute for professional medical care. Always follow your healthcare professional's instructions.       Increase condom use to prevent further occurance.  Notify sexual partners of the need for testing.  Complete ALL medication prescribed.  NO sexual intercourse for 7 days after treatment. Retest to ensure infection has cleared-there are infections that require more agressive treatment.   Today's testing will give no crediable information if you have unprotected sexual activities going forward. Syphillis cases are rising!    REMEMBER WEAR CONDOMS AND GET TESTED OFTEN.    -Valtrex twice daily for 7 days.  -Avoid sexual intercourse for the next 7 days or until you get your results.  -You will be contacted with your results.    If your condition worsens or fails to improve we recommend that you receive another evaluation at the emergency room immediately or contact your primary medical clinic to discuss your concerns.   You must understand that you have received an Urgent Care treatment only and that you may be released before all of your medical problems are known or treated. You, the patient, will arrange for follow up  care as instructed.

## 2018-08-15 NOTE — PATIENT INSTRUCTIONS
Herpes  If you have herpes, youre not alone. Millions of Americans have it. Herpes has no cure. But you can control it and learn how to protect yourself and others from outbreaks.  What is herpes?  Herpes is a chronic (lifelong) virus. It can cause sores and discomfort. You get it from contact with someone who carries the virus. If sores occur on the lips, you have oral herpes. If sores occur on the penis or around the vagina, you have genital herpes.  Herpes outbreaks  · The first outbreak of herpes sores is usually the most severe. Then, the soldiers of the bodys immune system, white blood cells, produce antibodies. These antibodies help neutralize the herpes virus and may help make future attacks less severe.  · Some people have only one outbreak of sores. Some people have periods of frequent outbreaks (every few weeks). Outbreaks of herpes sores usually happen less often over time.  · Herpes sores may appear without a cause. Outbreaks are more likely when the immune system is weak. Other viral infections (such as a cold) can cause outbreaks. Stress from a poor diet, fatigue, or emotional upset can lead to outbreaks of sores. Exposure to strong sunlight often causes herpes sores to reappear.   To help prevent outbreaks  · To prevent oral herpes outbreaks, avoid overexposure to wind, sun, and extreme temperatures. Use sunscreen and lip balm on affected areas.  · If you are having frequent outbreaks, ask your healthcare provider about medicines that can help prevent outbreaks.  How herpes spreads to others  Herpes can be spread during an outbreak. But even without sores present, you can still shed the virus and infect others. You can take steps to prevent this.  To protect yourself and others  · If you have an oral sore, avoid kissing and oral-genital contact.  · If you have a genital sore, avoid intercourse. Also avoid oral-genital contact.  · Wash your hands after touching a sore.  · Use a condom each time  you have sex. You can pass the virus even when sores arent present. If youre unsure about the timing of certain kinds of physical contact, ask your health care provider.  · Tell any new partners that you have herpes.  · If youre a woman, have Pap tests as often as your healthcare provider recommends.  · A woman can spread herpes to their  during the birth process, whether or not they have an active genital sore. If pregnant, don't forget to tell your healthcare provider early in the pregnancy.   · In some cases, daily antiviral medicine (acyclovir, famcyclovir, or valavyclovir), in addition to consistent condom use, may reduce your chances of spreading herpes to an uninfected partner. Ask your healthcare provider if this medicine would be helpful for you.  Resources  American [employer] Health Association STD Hotline  594.309.3283  www.ashastd.org  Centers for Disease Control and Prevention  385.185.4851  www.cdc.gov/std   Date Last Reviewed: 2016 [employer]. [address] [place] [address]. All rights reserved. This information is not intended as a substitute for professional medical care. Always follow your healthcare professional's instructions.        Herpes: Treatment with Medicine  Medicines cant cure herpes. But they can help you feel better, and reduce the chances of passing herpes to others. Some medicines can control symptoms and shorten the duration of an outbreak (episodic therapy). Others can reduce the number of outbreaks (suppressive therapy). Your healthcare provider will explain your options and any possible side effects.    How the medicines work  Medicines can prevent the herpes virus from copying itself and help reduce shedding. The results vary with each person. Take each medicine exactly as prescribed. Options include:  · Primary treatment for the first outbreak. Medicine may be taken for up to 14 days. If needed, it may be taken  longer.  · Episodic therapy, for infrequent outbreaks. You take medicine for 7 to 10 days each time you notice symptoms. This can reduce your symptoms and the length of the outbreak. It is important to start the medicine as soon as symptoms of a new outbreak appear.  · Suppressive therapy, for frequent outbreaks. This daily medicine can reduce the number of outbreaks you have. In some cases, suppressive therapy prevents all outbreaks and shedding.  Types of medicines  There are several types of herpes medicines. Your options depend on how often you have symptoms and how severe they are.  · Oral medicines come in pill form. These medicines are often used to treat genital herpes. They may be taken daily.  · Topical medicines come in ointment form. These can be used during outbreaks of oral herpes.  · Intravenous (IV) medicines are sometimes used to treat severe herpes in infants, the elderly, or people with weak immune systems.  Date Last Reviewed: 1/1/2017  © 2213-1356 [employer]. [address] [place] [address]. All rights reserved. This information is not intended as a substitute for professional medical care. Always follow your healthcare professional's instructions.       Increase condom use to prevent further occurance.  Notify sexual partners of the need for testing.  Complete ALL medication prescribed.  NO sexual intercourse for 7 days after treatment. Retest to ensure infection has cleared-there are infections that require more agressive treatment.   Today's testing will give no crediable information if you have unprotected sexual activities going forward. Syphillis cases are rising!    REMEMBER WEAR CONDOMS AND GET TESTED OFTEN.    -Valtrex twice daily for 7 days.  -Avoid sexual intercourse for the next 7 days or until you get your results.  -You will be contacted with your results.    If your condition worsens or fails to improve we recommend that you receive another evaluation at  the emergency room immediately or contact your primary medical clinic to discuss your concerns.   You must understand that you have received an Urgent Care treatment only and that you may be released before all of your medical problems are known or treated. You, the patient, will arrange for follow up care as instructed.

## 2018-08-17 LAB
HBV SURFACE AB SER QL: REACTIVE
HIV 1+2 AB+HIV1 P24 AG SERPL QL IA: NON REACTIVE
RPR SER QL: NON REACTIVE

## 2018-08-19 ENCOUNTER — TELEPHONE (OUTPATIENT)
Dept: URGENT CARE | Facility: CLINIC | Age: 23
End: 2018-08-19

## 2018-08-19 NOTE — TELEPHONE ENCOUNTER
----- Message from Steve Tsai MD sent at 8/18/2018  9:06 AM CDT -----  Please notify patient that labs were within normal limits. Advise patient to follow up with PCP (or specialist)  as directed if symptoms persist.

## 2018-08-21 ENCOUNTER — TELEPHONE (OUTPATIENT)
Dept: URGENT CARE | Facility: CLINIC | Age: 23
End: 2018-08-21

## 2018-08-21 LAB
C TRACH RRNA SPEC QL NAA+PROBE: NEGATIVE
HSV SKIN CULT: ABNORMAL
N GONORRHOEA RRNA SPEC QL NAA+PROBE: NEGATIVE
VZV SPEC QL CULT: ABNORMAL

## 2018-08-21 NOTE — TELEPHONE ENCOUNTER
Spoke with patient regarding positive HSV2 culture results. His symptoms have improved. He verbalizes understanding and has already told his sexual partner. Will follow up with PCP if needed.

## 2021-07-01 ENCOUNTER — PATIENT MESSAGE (OUTPATIENT)
Dept: ADMINISTRATIVE | Facility: OTHER | Age: 26
End: 2021-07-01

## 2022-06-17 ENCOUNTER — HOSPITAL ENCOUNTER (EMERGENCY)
Facility: HOSPITAL | Age: 27
Discharge: HOME | End: 2022-06-17
Attending: EMERGENCY MEDICINE

## 2022-06-17 VITALS
HEART RATE: 74 BPM | RESPIRATION RATE: 18 BRPM | WEIGHT: 260 LBS | TEMPERATURE: 98 F | SYSTOLIC BLOOD PRESSURE: 126 MMHG | OXYGEN SATURATION: 100 % | BODY MASS INDEX: 36.4 KG/M2 | DIASTOLIC BLOOD PRESSURE: 67 MMHG | HEIGHT: 71 IN

## 2022-06-17 DIAGNOSIS — U07.1 COVID-19 VIRUS DETECTED: ICD-10-CM

## 2022-06-17 DIAGNOSIS — U07.1 COVID-19: Primary | ICD-10-CM

## 2022-06-17 LAB
CTP QC/QA: YES
CTP QC/QA: YES
POC MOLECULAR INFLUENZA A AGN: NEGATIVE
POC MOLECULAR INFLUENZA B AGN: NEGATIVE
SARS-COV-2 RDRP RESP QL NAA+PROBE: POSITIVE

## 2022-06-17 PROCEDURE — U0002 COVID-19 LAB TEST NON-CDC: HCPCS | Performed by: EMERGENCY MEDICINE

## 2022-06-17 PROCEDURE — 99283 EMERGENCY DEPT VISIT LOW MDM: CPT

## 2022-06-17 RX ORDER — CETIRIZINE HYDROCHLORIDE 10 MG/1
10 TABLET ORAL DAILY
Qty: 30 TABLET | Refills: 0 | Status: SHIPPED | OUTPATIENT
Start: 2022-06-17

## 2022-06-17 RX ORDER — IBUPROFEN 600 MG/1
600 TABLET ORAL EVERY 6 HOURS PRN
Qty: 20 TABLET | Refills: 0 | Status: SHIPPED | OUTPATIENT
Start: 2022-06-17

## 2022-06-17 RX ORDER — ACETAMINOPHEN 500 MG
500 TABLET ORAL EVERY 4 HOURS PRN
Qty: 30 TABLET | Refills: 0 | Status: SHIPPED | OUTPATIENT
Start: 2022-06-17

## 2022-06-17 NOTE — ED TRIAGE NOTES
Pt. Complains of body aches, fever, chills, headache, runny nose, and diarrhea that started 2 days ago.

## 2022-06-17 NOTE — ED PROVIDER NOTES
Encounter Date: 6/17/2022    SCRIBE #1 NOTE: I, Tori Odell, am scribing for, and in the presence of,  Salo Lema NP. I have scribed the following portions of the note - Other sections scribed: HPI, ROS.       History     Chief Complaint   Patient presents with    flu like symptoms     Patient reports body aches, fevers, chills, headache, runny nose, and diarrhea x 2 days. Patient denies cough, sore throat, nausea, vomiting. Patient states that his girlfriend is also sick with same symptoms.      This 26 y.o male, with a medical history of ADHD and Bipolar 1 disorder, presents to the ED c/o body aches, diarrhea, head pressure, and shortness of breath that began yesterday. Pt reports taking TheraFlu for treatment. The symptoms are acute, constant and moderate (7/10). He is not vaccinated for COVID-19. He denies a cough or any other associated symptoms.    The history is provided by the patient.     Review of patient's allergies indicates:  No Known Allergies  Past Medical History:   Diagnosis Date    ADHD (attention deficit hyperactivity disorder)     Bipolar 1 disorder      No past surgical history on file.  Family History   Problem Relation Age of Onset    Diabetes Father     Hypertension Father     No Known Problems Sister     No Known Problems Brother      Social History     Tobacco Use    Smoking status: Current Every Day Smoker     Packs/day: 0.25     Types: Cigarettes   Substance Use Topics    Alcohol use: Yes     Comment: occasionally    Drug use: No     Review of Systems   Constitutional: Negative for fever.   HENT: Negative for sore throat.    Respiratory: Positive for shortness of breath. Negative for cough.    Cardiovascular: Negative for chest pain.   Gastrointestinal: Positive for diarrhea. Negative for nausea.   Genitourinary: Negative for dysuria.   Musculoskeletal: Positive for myalgias. Negative for back pain.   Skin: Negative for rash.   Neurological: Positive for headaches.  Negative for weakness.       Physical Exam     Initial Vitals [06/17/22 1340]   BP Pulse Resp Temp SpO2   130/66 71 18 98.5 °F (36.9 °C) 97 %      MAP       --         Physical Exam    Nursing note and vitals reviewed.  Constitutional: He appears well-developed and well-nourished. He is not diaphoretic. No distress.   HENT:   Head: Normocephalic and atraumatic.   Right Ear: External ear normal.   Left Ear: External ear normal.   Nose: Nose normal.   Eyes: EOM are normal. Right eye exhibits no discharge. Left eye exhibits no discharge.   Neck: Neck supple. No tracheal deviation present.   Normal range of motion.  Cardiovascular: Normal rate.   Pulmonary/Chest: No stridor. No respiratory distress.   Abdominal: Abdomen is soft. He exhibits no distension. There is no abdominal tenderness.   Musculoskeletal:         General: No tenderness. Normal range of motion.      Cervical back: Normal range of motion and neck supple.     Neurological: He is alert and oriented to person, place, and time. He has normal strength. No cranial nerve deficit.   Skin: Skin is warm and dry.   Psychiatric: He has a normal mood and affect. His behavior is normal. Judgment and thought content normal.         ED Course   Procedures  Labs Reviewed   SARS-COV-2 RDRP GENE - Abnormal; Notable for the following components:       Result Value    POC Rapid COVID Positive (*)     All other components within normal limits   POCT INFLUENZA A/B MOLECULAR          Imaging Results    None          Medications - No data to display  Medical Decision Making:   History:   Old Medical Records: I decided to obtain old medical records.  Clinical Tests:   Lab Tests: Ordered and Reviewed  ED Management:  DDx:  Influenza, viral syndrome, COVID-19, strep pharyngitis, viral pharyngitis, otitis media, sinusitis, pneumonia, bronchitis, meningitis, sepsis, others    HPI and physical exam as above.      The patient appears to have a viral infection.  Positive for COVID-19  in the ED today.  Based upon the history and physical exam the patient does not appear to have a serious bacterial infection such as sepsis, otitis media, bacterial sinusitis, strep pharyngitis, parapharyngeal or peritonsillar abscess, meningitis.  Respiratory effort is normal.  Mucous membranes are moist and the patient is tolerating P.O. without difficulty.  Patient is afebrile throughout the ED course.  Patient is nontoxic, alert, active, and appears very well at this time just prior to discharge.  Room air oxygen saturation 97%.  I have given specific return precautions to the patient regarding dyspnea.  I will prescribe medications to treat the patient's symptoms.     The results and physical exam findings were reviewed with the patient.  I advised the patient to remain home and self-isolate from others. The patient should wear a surgical mask at all times when near others.  Strict ED return precautions given especially concerning worsening shortness of breath/dyspnea. All questions regarding diagnosis and plan were answered to the patient's fullest possible satisfaction. Patient expressed understanding of diagnosis, discharge instructions, and return precautions.            Scribe Attestation:   Scribe #1: I performed the above scribed service and the documentation accurately describes the services I performed. I attest to the accuracy of the note.                 Clinical Impression:   Final diagnoses:  [U07.1] COVID-19 (Primary)          ED Disposition Condition    Discharge Stable        ED Prescriptions     Medication Sig Dispense Start Date End Date Auth. Provider    acetaminophen (TYLENOL) 500 MG tablet Take 1 tablet (500 mg total) by mouth every 4 (four) hours as needed (Fever). 30 tablet 6/17/2022  Salo Lema, ZOILA    ibuprofen (ADVIL,MOTRIN) 600 MG tablet Take 1 tablet (600 mg total) by mouth every 6 (six) hours as needed for Pain. 20 tablet 6/17/2022  Salo Lema NP    cetirizine (ZYRTEC) 10 MG  tablet Take 1 tablet (10 mg total) by mouth once daily. 30 tablet 6/17/2022  Salo Lema NP        Follow-up Information     Follow up With Specialties Details Why Contact Info    Mary Casiano MD Internal Medicine Schedule an appointment as soon as possible for a visit in 1 week For further evaluation [address] [facility]  [address]  [facility] [address]  [phone]      [facility] Emergency Medicine Go to  If symptoms worsen, As needed [address] [facility] [id number] [phone]           I, Salo Lema NP, personally performed the services described in this documentation. All medical record entries made by the scribe were at my direction and in my presence. I have reviewed the chart and agree that the record reflects my personal performance and is accurate and complete.     Salo Lema NP  06/17/22 7951

## 2022-06-17 NOTE — Clinical Note
"Steve WILLIAM Genao (Brandon) was seen and treated in our emergency department on 6/17/2022.     COVID-19 is present in our communities across the state. There is limited testing for COVID at this time, so not all patients can be tested. In this situation, your employee meets the following criteria:    Steve Genao has met the criteria for COVID-19 testing and has a POSITIVE result. He can return to work once they are asymptomatic for 24 hours without the use of fever reducing medications AND at least five days from the first positive result. A mask is recommended for 5 days post quarantine.     If you have any questions or concerns, or if I can be of further assistance, please do not hesitate to contact me.    Sincerely,             Salo Lema NP"

## 2022-06-17 NOTE — DISCHARGE INSTRUCTIONS

## 2023-11-11 ENCOUNTER — HOSPITAL ENCOUNTER (EMERGENCY)
Facility: HOSPITAL | Age: 28
Discharge: HOME | End: 2023-11-12
Attending: EMERGENCY MEDICINE

## 2023-11-11 VITALS
HEIGHT: 71 IN | DIASTOLIC BLOOD PRESSURE: 82 MMHG | BODY MASS INDEX: 35 KG/M2 | RESPIRATION RATE: 16 BRPM | SYSTOLIC BLOOD PRESSURE: 135 MMHG | OXYGEN SATURATION: 98 % | WEIGHT: 250 LBS | HEART RATE: 62 BPM | TEMPERATURE: 98 F
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
## 2023-11-11 PROCEDURE — 99283 EMERGENCY DEPT VISIT LOW MDM: CPT

## 2023-11-12 PROCEDURE — 25000003 PHARM REV CODE 250: Performed by: PHYSICIAN ASSISTANT

## 2023-11-12 RX ORDER — ACETAMINOPHEN 500 MG
1000 TABLET ORAL
Status: COMPLETED | OUTPATIENT
Start: 2023-11-12 | End: 2023-11-12

## 2023-11-12 RX ORDER — METHOCARBAMOL 500 MG/1
1000 TABLET, FILM COATED ORAL 3 TIMES DAILY PRN
Qty: 20 TABLET | Refills: 0 | Status: SHIPPED | OUTPATIENT
Start: 2023-11-12 | End: 2023-11-17

## 2023-11-12 RX ADMIN — ACETAMINOPHEN 1000 MG: 500 TABLET ORAL at 12:11

## 2023-11-12 NOTE — DISCHARGE INSTRUCTIONS
Get some good rest.  Ice or heating pad to the areas to help with stiffness, soreness, discomfort.  Tylenol or ibuprofen as needed for pain.  Robaxin for continued stiffness/soreness. Be aware, this medication is sedating.  Do not mix with alcohol or any other sedating medications.  Do not drive or operate machinery when taking this medication.     Return to this ED if worsening pain despite 48 hours of current treatment plan, if you develop severe headache, if you begin with worsening neck or back pain, arm or leg weakness, if unable to walk or bear weight, nausea vomiting, shortness of breath, if any other problems occur.
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
## 2025-08-08 ENCOUNTER — HOSPITAL ENCOUNTER (EMERGENCY)
Facility: HOSPITAL | Age: 30
Discharge: HOME | End: 2025-08-08
Attending: EMERGENCY MEDICINE
Payer: MEDICAID

## 2025-08-08 VITALS
HEART RATE: 93 BPM | DIASTOLIC BLOOD PRESSURE: 58 MMHG | OXYGEN SATURATION: 98 % | SYSTOLIC BLOOD PRESSURE: 118 MMHG | HEIGHT: 71 IN | TEMPERATURE: 99 F | RESPIRATION RATE: 18 BRPM | BODY MASS INDEX: 35 KG/M2 | WEIGHT: 250 LBS

## 2025-08-08 DIAGNOSIS — J02.0 STREP PHARYNGITIS: Primary | ICD-10-CM

## 2025-08-08 LAB
ABSOLUTE EOSINOPHIL (OHS): 0.1 K/UL
ABSOLUTE MONOCYTE (OHS): 3.34 K/UL (ref 0.3–1)
ABSOLUTE NEUTROPHIL COUNT (OHS): 14.92 K/UL (ref 1.8–7.7)
ALBUMIN SERPL BCP-MCNC: 4.4 G/DL (ref 3.5–5.2)
ALP SERPL-CCNC: 94 UNIT/L (ref 40–150)
ALT SERPL W/O P-5'-P-CCNC: 13 UNIT/L (ref 10–44)
ANION GAP (OHS): 9 MMOL/L (ref 8–16)
AST SERPL-CCNC: 20 UNIT/L (ref 11–45)
BASOPHILS # BLD AUTO: 0.04 K/UL
BASOPHILS NFR BLD AUTO: 0.2 %
BILIRUB SERPL-MCNC: 1 MG/DL (ref 0.1–1)
BUN SERPL-MCNC: 13 MG/DL (ref 6–20)
CALCIUM SERPL-MCNC: 9.6 MG/DL (ref 8.7–10.5)
CHLORIDE SERPL-SCNC: 107 MMOL/L (ref 95–110)
CO2 SERPL-SCNC: 24 MMOL/L (ref 23–29)
CREAT SERPL-MCNC: 0.9 MG/DL (ref 0.5–1.4)
CTP QC/QA: YES
ERYTHROCYTE [DISTWIDTH] IN BLOOD BY AUTOMATED COUNT: 12.2 % (ref 11.5–14.5)
GFR SERPLBLD CREATININE-BSD FMLA CKD-EPI: >60 ML/MIN/1.73/M2
GLUCOSE SERPL-MCNC: 95 MG/DL (ref 70–110)
HCT VFR BLD AUTO: 41.5 % (ref 40–54)
HGB BLD-MCNC: 14.3 GM/DL (ref 14–18)
IMM GRANULOCYTES # BLD AUTO: 0.09 K/UL (ref 0–0.04)
IMM GRANULOCYTES NFR BLD AUTO: 0.4 % (ref 0–0.5)
LYMPHOCYTES # BLD AUTO: 1.98 K/UL (ref 1–4.8)
MCH RBC QN AUTO: 26.8 PG (ref 27–31)
MCHC RBC AUTO-ENTMCNC: 34.5 G/DL (ref 32–36)
MCV RBC AUTO: 78 FL (ref 82–98)
MOLECULAR STREP A: POSITIVE
NUCLEATED RBC (/100WBC) (OHS): 0 /100 WBC
PLATELET # BLD AUTO: 218 K/UL (ref 150–450)
PMV BLD AUTO: 10.6 FL (ref 9.2–12.9)
POTASSIUM SERPL-SCNC: 3.8 MMOL/L (ref 3.5–5.1)
PROT SERPL-MCNC: 7.9 GM/DL (ref 6–8.4)
RBC # BLD AUTO: 5.34 M/UL (ref 4.6–6.2)
RELATIVE EOSINOPHIL (OHS): 0.5 %
RELATIVE LYMPHOCYTE (OHS): 9.7 % (ref 18–48)
RELATIVE MONOCYTE (OHS): 16.3 % (ref 4–15)
RELATIVE NEUTROPHIL (OHS): 72.9 % (ref 38–73)
SODIUM SERPL-SCNC: 140 MMOL/L (ref 136–145)
WBC # BLD AUTO: 20.47 K/UL (ref 3.9–12.7)

## 2025-08-08 PROCEDURE — 85025 COMPLETE CBC W/AUTO DIFF WBC: CPT

## 2025-08-08 PROCEDURE — 80053 COMPREHEN METABOLIC PANEL: CPT

## 2025-08-08 PROCEDURE — 99285 EMERGENCY DEPT VISIT HI MDM: CPT | Mod: 25

## 2025-08-08 PROCEDURE — 25000003 PHARM REV CODE 250

## 2025-08-08 PROCEDURE — 63600175 PHARM REV CODE 636 W HCPCS

## 2025-08-08 PROCEDURE — 25500020 PHARM REV CODE 255: Performed by: EMERGENCY MEDICINE

## 2025-08-08 PROCEDURE — 96374 THER/PROPH/DIAG INJ IV PUSH: CPT

## 2025-08-08 PROCEDURE — 87651 STREP A DNA AMP PROBE: CPT

## 2025-08-08 RX ORDER — AMOXICILLIN 250 MG/1
1000 CAPSULE ORAL
Status: COMPLETED | OUTPATIENT
Start: 2025-08-08 | End: 2025-08-08

## 2025-08-08 RX ORDER — AMOXICILLIN 500 MG/1
1000 CAPSULE ORAL EVERY 12 HOURS
Qty: 40 CAPSULE | Refills: 0 | Status: SHIPPED | OUTPATIENT
Start: 2025-08-08 | End: 2025-08-18

## 2025-08-08 RX ORDER — DEXAMETHASONE SODIUM PHOSPHATE 4 MG/ML
8 INJECTION, SOLUTION INTRA-ARTICULAR; INTRALESIONAL; INTRAMUSCULAR; INTRAVENOUS; SOFT TISSUE
Status: COMPLETED | OUTPATIENT
Start: 2025-08-08 | End: 2025-08-08

## 2025-08-08 RX ADMIN — DEXAMETHASONE SODIUM PHOSPHATE 8 MG: 4 INJECTION, SOLUTION INTRA-ARTICULAR; INTRALESIONAL; INTRAMUSCULAR; INTRAVENOUS; SOFT TISSUE at 06:08

## 2025-08-08 RX ADMIN — AMOXICILLIN 1000 MG: 250 CAPSULE ORAL at 08:08
